# Patient Record
Sex: FEMALE | Race: WHITE | NOT HISPANIC OR LATINO | Employment: UNEMPLOYED | URBAN - METROPOLITAN AREA
[De-identification: names, ages, dates, MRNs, and addresses within clinical notes are randomized per-mention and may not be internally consistent; named-entity substitution may affect disease eponyms.]

---

## 2020-01-01 ENCOUNTER — TELEPHONE (OUTPATIENT)
Dept: NEUROLOGY | Facility: CLINIC | Age: 80
End: 2020-01-01

## 2020-01-01 ENCOUNTER — OFFICE VISIT (OUTPATIENT)
Dept: NEUROLOGY | Facility: CLINIC | Age: 80
End: 2020-01-01

## 2020-01-01 ENCOUNTER — LAB (OUTPATIENT)
Dept: LAB | Facility: HOSPITAL | Age: 80
End: 2020-01-01

## 2020-01-01 VITALS
HEART RATE: 87 BPM | SYSTOLIC BLOOD PRESSURE: 120 MMHG | TEMPERATURE: 96.8 F | OXYGEN SATURATION: 97 % | DIASTOLIC BLOOD PRESSURE: 80 MMHG | HEIGHT: 64 IN | WEIGHT: 160 LBS | BODY MASS INDEX: 27.31 KG/M2

## 2020-01-01 DIAGNOSIS — G20 PARKINSON'S DISEASE (HCC): ICD-10-CM

## 2020-01-01 DIAGNOSIS — R13.10 DYSPHAGIA, UNSPECIFIED TYPE: ICD-10-CM

## 2020-01-01 DIAGNOSIS — G20 PARKINSON'S DISEASE (HCC): Primary | ICD-10-CM

## 2020-01-01 LAB
PHENOBARB UR QL: 15 UG/ML (ref 15–40)
PRIMIDONE SERPL-MCNC: 13.2 UG/ML (ref 5–12)

## 2020-01-01 PROCEDURE — 80188 ASSAY OF PRIMIDONE: CPT

## 2020-01-01 PROCEDURE — 80184 ASSAY OF PHENOBARBITAL: CPT

## 2020-01-01 PROCEDURE — 36415 COLL VENOUS BLD VENIPUNCTURE: CPT

## 2020-01-01 PROCEDURE — 99203 OFFICE O/P NEW LOW 30 MIN: CPT | Performed by: NURSE PRACTITIONER

## 2020-01-01 RX ORDER — CHOLECALCIFEROL (VITAMIN D3) 125 MCG
5 CAPSULE ORAL NIGHTLY
COMMUNITY

## 2020-01-01 RX ORDER — BUSPIRONE HYDROCHLORIDE 5 MG/1
TABLET ORAL
COMMUNITY
Start: 2020-01-01

## 2020-01-01 RX ORDER — ALBUTEROL SULFATE 90 UG/1
AEROSOL, METERED RESPIRATORY (INHALATION)
COMMUNITY
Start: 2020-01-01

## 2020-01-01 RX ORDER — LORAZEPAM 0.5 MG/1
0.25 TABLET ORAL 2 TIMES DAILY
COMMUNITY

## 2020-01-01 RX ORDER — NITROFURANTOIN 25; 75 MG/1; MG/1
CAPSULE ORAL
COMMUNITY
Start: 2020-01-01

## 2020-01-01 RX ORDER — LANSOPRAZOLE 15 MG/1
15 CAPSULE, DELAYED RELEASE ORAL DAILY
COMMUNITY

## 2020-01-01 RX ORDER — CARVEDILOL 25 MG/1
TABLET ORAL
COMMUNITY
Start: 2020-01-01

## 2020-01-01 RX ORDER — SENNA PLUS 8.6 MG/1
1 TABLET ORAL DAILY
COMMUNITY

## 2020-01-01 RX ORDER — LEVODOPA AND CARBIDOPA 145; 36.25 MG/1; MG/1
CAPSULE, EXTENDED RELEASE ORAL
COMMUNITY
Start: 2020-01-01

## 2020-01-01 RX ORDER — APIXABAN 5 MG/1
TABLET, FILM COATED ORAL
COMMUNITY
Start: 2020-01-01

## 2020-08-24 NOTE — TELEPHONE ENCOUNTER
CALLED PT, NO ANSWER.  TO SEE IF PT WOULD LIKE SOONER APPT WITH [facility].  PT CAN BE TRANSFERRED TO OFFICE.

## 2020-09-10 NOTE — PROGRESS NOTES
Subjective:     Patient ID: Kylie Rosado is a 79 y.o. female.    CC:   Chief Complaint   Patient presents with   • Parkinson's Disease       HPI:   History of Present Illness     Ms. Rosado is a very pleasant medically complicated 79-year-old female.  She is here to establish care for Parkinson's disease.  She has been followed for many years by Dr. Mao in [facility] who has closed her clinic.  She has a significant medical history including heart transplant 2002, she is followed by [facility].  She has a history of non-Hodgkin's lymphoma, hypertension, hyperlipidemia, chronic back issues.  She has been wheelchair-bound for at least 3 years.  They tell me that she had a pain pump in place which dislodged after a fall causing leakage of medication into her lower back and pelvis, she has not walked since that time.  She says that she is unable to bear weight on her feet at all, she is in physical therapy through home health.  She has been on hospice intermittently, she is currently on palliative care and receives home health through [facility].    They tell me she was diagnosed at least 10 years ago with Parkinson's disease, she apparently had benign essential tremor initially.  She is here today with her .  She last saw Dr. Mao about 1 to 2 years ago, has been reports that her hospice providers have been prescribing all of her medications, none have changed since she saw her neurologist previously.  For her Parkinson's disease she is currently taking carbidopa levodopa 25/102 pills at 6 AM 10 AM 2 PM and 6 PM along with extended release Rytary 145 mg at 2 PM and 6 PM.  She is also on Neupro patch 8 mg applied daily at 2 PM as well as primidone 150 mg at 10 AM and 6 PM.  Her primary issue with Parkinson's disease was previously resting tremor and gait disorder however as noted above she has been immobile in a wheelchair for at least 3 years now due to other issues.  She is incontinent of bowel and bladder  and has been for approximately 5 to 6 years due to back issues.  Apparently when she was switched to palliative care they preferred her medications be refilled through neurology  They deny any dyskinetic movements, denies sedation.  She had apparently been working with home PT and was walking with assist of 2 until about 2 months ago when she had a significant pneumonia which has set her back.  She denies any hallucinations either visual or auditory at this time, she and her  report this is occurred in the past but not in quite some time.  She has occasional headaches.  She denies speech changes but at times has trouble swallowing.  She is unsure when last swallow study was done.  She does feel like she gets choked easily on food.  She has occasional dizziness, no syncope.    The following portions of the patient's history were reviewed and updated as appropriate: allergies, current medications, past family history, past medical history, past social history, past surgical history and problem list.    Past Medical History:   Diagnosis Date   • Anxiety    • Arthritis    • Cancer (CMS/HCC)    • Congestive heart failure (CMS/HCC)    • Fall    • Hyperlipidemia    • Hypertension    • Incontinence    • Non Hodgkin's lymphoma (CMS/HCC)    • Parkinson disease (CMS/HCC)        Past Surgical History:   Procedure Laterality Date   • AORTIC VALVE REPAIR/REPLACEMENT MITRAL VALVE REPAIR/REPLACEMENT     • AXILLARY LYMPH NODE BIOPSY/EXCISION     • BUNIONECTOMY     • CATARACT EXTRACTION     • CYSTOPLASTY     • HEART TRANSPLANT     • HYSTERECTOMY     • SHOULDER SURGERY     • WRIST SURGERY         Social History     Socioeconomic History   • Marital status:      Spouse name: Not on file   • Number of children: Not on file   • Years of education: Not on file   • Highest education level: Not on file   Tobacco Use   • Smoking status: Former Smoker     Packs/day: 1.00     Years: 19.00     Pack years: 19.00     Types:  "Cigarettes     Last attempt to quit:      Years since quittin.7   • Smokeless tobacco: Never Used   Substance and Sexual Activity   • Alcohol use: No   • Drug use: No   • Sexual activity: Defer       Family History   Problem Relation Age of Onset   • Heart disease Mother    • Cancer Father         Review of Systems   Constitutional: Positive for fatigue.   HENT: Positive for trouble swallowing. Negative for hearing loss and tinnitus. Voice change:  She has a low voice but this is unchanged over several years.    Eyes: Negative.    Respiratory: Negative.    Cardiovascular: Negative.    Musculoskeletal:        She has a history of chronic low back pain, pain pump is in place, this malfunctioned and dislodged and has been turned off for about 6 to 8 months now   Neurological: Positive for dizziness ( Occasional), tremors and weakness. Negative for seizures, syncope, facial asymmetry and numbness. Headaches:  Occasional.        Objective:  /80   Pulse 87   Temp 96.8 °F (36 °C)   Ht 162.6 cm (64\")   Wt 72.6 kg (160 lb)   SpO2 97%   BMI 27.46 kg/m²     Neurologic Exam     Mental Status   Oriented to person, place, and time.   Patient is alert and oriented, she has a low voice quality, masked face noted, she answers all questions appropriately no evidence of confusion or memory issue on exam today     Cranial Nerves     CN III, IV, VI   Pupils are equal, round, and reactive to light.  Extraocular motions are normal.   CN III: no CN III palsy  CN VI: no CN VI palsy  Nystagmus: none     CN V   Facial sensation intact.     CN VII   Facial expression full, symmetric.     CN IX, X   CN IX normal.   CN X normal.     CN XII   CN XII normal.   She has generalized weakness otherwise cranial nerves are intact     Motor Exam Generalized weakness noted, she moves all extremities without problem.  No cogwheel rigidity is noted on exam today, no dyskinetic movements     Gait, Coordination, and Reflexes "     Gait  Gait: (Patient is wheelchair-bound)    Coordination   Finger-nose-finger test: Finger-to-nose testing done with tremor noted.She has a mild resting tremor bilateral upper extremities greater in the left  DTRs decreased throughout       Physical Exam   Constitutional: She is oriented to person, place, and time.   Patient is a thin frail elderly female in wheelchair   HENT:   Head: Atraumatic.   Eyes: Pupils are equal, round, and reactive to light. Conjunctivae and EOM are normal.   Neck: Neck supple.   Cardiovascular: Normal rate.   Pulmonary/Chest: Effort normal.   Patient has a cough, she apparently has had problems with pneumonia and bronchitis for a couple months now   Neurological: She is alert and oriented to person, place, and time. Finger-nose-finger test: Finger-to-nose testing done with tremor noted.   Skin: Skin is warm.   Nursing note and vitals reviewed.      Assessment/Plan:       Kylie was seen today for parkinson's disease.    Diagnoses and all orders for this visit:    Parkinson's disease (CMS/[facility])  -     Primidone Level; Future  -     Ambulatory Referral to Speech Therapy    Dysphagia, unspecified type  -     Ambulatory Referral to Speech Therapy    Ms. Rosado is a very pleasant, long history of Parkinson's disease previously followed by neurology in [facility].  She has not actually seen them for a couple years as she has been followed by hospice and palliative care who have been prescribing her medications.  She is on both short acting and long-acting carbidopa levodopa, Neupro and primidone which I assume was for previous diagnosis of essential tremor as well as Parkinson's disease.  I did speak with Dr. Starr, no medication changes today as she is having no problems with dyskinetic movements to indicate overmedication with carbidopa levodopa.  I would like to check her primidone level today she is having occasional dizziness.  I do not have labs available to me but they are  "monitored monthly through [facility] transplant center, they report she has a history of renal failure to some degree.  Certainly primidone is on the high end I would like to lower dose just a bit to see how she responds, we will call them with lab report and any medication changes once primidone levels reviewed.  Has been reports they do not need refills today, he prefer I not send these in as they \"pile up\".  He will contact me a couple weeks prior to needing refills so that I can send them and accordingly to [facility]  At this point I will see them back in 3 months via telephone or video visit as they verbalize it is very hard for them to drive from [place] to [facility].  Certainly if she needs to be seen sooner I have recommended they contact me ASAP  I am going to try to arrange a swallow study through her home health agency as well  PCP notes reviewed prior to appointment         Reviewed medications, potential side effects and signs and symptoms to report. Discussed risk versus benefits of treatment plan with patient and/or family-including medications, labs and radiology that may be ordered. Addressed questions and concerns during visit. Patient and/or family verbalized understanding and agree with plan.    AS THE PROVIDER, I PERSONALLY WORE PPE DURING ENTIRE FACE TO FACE ENCOUNTER IN CLINIC WITH THE PATIENT. PATIENT ALSO WORE PPE DURING ENTIRE FACE TO FACE ENCOUNTER EXCEPT FOR A MAX OF 30 SECONDS DURING NEUROLOGICAL EVALUATION OF CRANIAL NERVES AND THEN MASK WAS PLACED BACK OVER PATIENT FACE FOR REMAINDER OF VISIT. I WASHED MY HANDS BEFORE AND AFTER VISIT.    During this visit the following were done:  Labs Reviewed []    Labs Ordered []    Radiology Reports Reviewed []    Radiology Ordered []    PCP Records Reviewed []    Referring Provider Records Reviewed []    ER Records Reviewed []    Hospital Records Reviewed []    History Obtained From Family []    Radiology Images Reviewed []  "   Other Reviewed []    Records Requested []      Ting Goyal, APRN  9/10/2020

## 2020-09-14 NOTE — TELEPHONE ENCOUNTER
Per Kami she can take 100mg twice daily (2-50 mg pills), and if tremors worsen the she can take 125mg twice daily (2.5 -50 mg pills). Repeat level 3-4 weeks. Can fax order to her  Home health for them to draw the lab.

## 2020-09-14 NOTE — TELEPHONE ENCOUNTER
Spoke to Kylie and told her that her Primidone level was too high. She thinks she is only taking one pill at night. She will have her  Guille call me back to verify her dose.

## 2020-09-14 NOTE — TELEPHONE ENCOUNTER
Guille () called me back and she is taking 3-50mg pills (150mg) twice a day. What would you like her to decrease to? Please advise.

## 2020-09-14 NOTE — TELEPHONE ENCOUNTER
----- Message from JULITA Hall sent at 9/14/2020 12:10 PM EDT -----  Please let pt or her  know her primidone level is out of range/too high.  Please verify how she is taking this (If I remember correctly she is taking 3 pills or 150 mg once a day), we will need to cut back on the dose just a bit

## 2020-09-14 NOTE — PROGRESS NOTES
Please let pt or her  know her primidone level is out of range/too high.  Please verify how she is taking this (If I remember correctly she is taking 3 pills or 150 mg once a day), we will need to cut back on the dose just a bit

## 2020-09-14 NOTE — TELEPHONE ENCOUNTER
Guille called back and informed of new dose. I will call [facility] to get the fax number to fax lab order.

## 2023-01-12 ENCOUNTER — TELEPHONE (OUTPATIENT)
Dept: NEUROLOGY | Facility: CLINIC | Age: 83
End: 2023-01-12

## 2023-01-12 NOTE — TELEPHONE ENCOUNTER
“Please be informed that patient has passed. Patient has been marked  in the system. The date of death is: 2021    Caller: Guille Rosado    Relationship: Emergency Contact    Best call back number: [phone]

## 2023-01-13 NOTE — TELEPHONE ENCOUNTER
Caller: DILIP TYLER  Relationship to Patient: SPOUSE  Phone Number: [phone]  Reason for Call: PATIENT'S SPOUSE RECEIVED A LETTER FROM THE OFFICE THAT MARCO  HERNÁN WAS LEAVING THE OFFICE SO HE CALLED TO  INFORM THE OFFICE THAT HIS WIFE HAD PASSED.     THANK YOU